# Patient Record
(demographics unavailable — no encounter records)

---

## 2024-12-27 NOTE — HISTORY OF PRESENT ILLNESS
[FreeTextEntry1] : Carson Wick is a 56- year- old male with History of DVT and Pulmonary embolism admitted to [facility] in 8/2024 and was treated and discharged on Eliquis. Says he was told to have cardiomyopathy and no cardiac medicines started. History of HIV. Current cigarette smoker. On and off Cocaine use. On Methadone. Lives alone. Denies any chest pain or palpitations. Shortness of breath on exertion which is relieved with rest in few minutes of few months' duration. Says compliant to medications. Does not eat much. Has BP on lower side. Occasional dizziness.

## 2024-12-27 NOTE — DISCUSSION/SUMMARY
[FreeTextEntry1] : In a summary Carson Wick is a middle- aged- male with cardiomyopathy and shortness of breath, will get Echo to assess LV systolic function. Further plan based on Echo results. BP on lower side, eat well and drink fluids. Use little more salt in food. Counseled to quit smoking cigarettes and cocaine use. Very well aware of adverse outcomes of continued use. Follow up in 2 weeks. Hospital discharge reviewed. Spent 50 minutes on encounter.

## 2024-12-27 NOTE — REVIEW OF SYSTEMS
[Dyspnea on exertion] : dyspnea during exertion [Joint Pain] : joint pain [Dizziness] : dizziness [Depression] : depression [Anxiety] : anxiety [Negative] : Respiratory [Blurry Vision] : no blurred vision [Chest Discomfort] : no chest discomfort [Lower Ext Edema] : no extremity edema [Palpitations] : no palpitations [Orthopnea] : no orthopnea [PND] : no PND [Abdominal Pain] : no abdominal pain [Nausea] : no nausea [Vomiting] : no vomiting [Heartburn] : no heartburn [Rash] : no rash [Itching] : no itching [Confusion] : no confusion was observed [Easy Bleeding] : no tendency for easy bleeding [Easy Bruising] : no tendency for easy bruising

## 2024-12-27 NOTE — PHYSICAL EXAM
[Normal Conjunctiva] : normal conjunctiva [No Carotid Bruit] : no carotid bruit [Soft] : abdomen soft [Non Tender] : non-tender [Normal Bowel Sounds] : normal bowel sounds [No Edema] : no edema [No Cyanosis] : no cyanosis [No Rash] : no rash [Normal] : alert and oriented, normal memory [Normal memory] : normal memory

## 2025-01-13 NOTE — HISTORY OF PRESENT ILLNESS
[FreeTextEntry1] : Carson Wick is a 56- year- old male with History of DVT and Pulmonary embolism admitted to [facility] in 8/2024 and was treated and discharged on Eliquis. Says he was told to have cardiomyopathy and no cardiac medicines started. History of HIV. Current cigarette smoker. On and off Cocaine use. On Methadone. Lives alone. Denies any chest pain or palpitations. Shortness of breath on exertion which is relieved with rest in few minutes of few months' duration. Says compliant to medications. Started eating better. Drinking enough fluids. Dizziness improved.

## 2025-01-13 NOTE — DISCUSSION/SUMMARY
[FreeTextEntry1] : In a summary Jorge Luis Wicko is a middle- aged- male with cardiomyopathy and shortness of breath, will get Echo to assess LV systolic function next visit. Further plan based on Echo results. BP on lower side hence no medications started.  Counseled to quit smoking cigarettes and cocaine use. Very well aware of adverse outcomes of continued use. Follow up in 3 months.

## 2025-05-21 NOTE — REASON FOR VISIT
[Initial] : an initial visit [Pneumonia] : pneumonia [COPD] : COPD [Pulmonary Embolism] : pulmonary embolism

## 2025-05-22 NOTE — REVIEW OF SYSTEMS
[Negative] : Endocrine [Cough] : cough [Sputum] : sputum [SOB on Exertion] : sob on exertion [Seasonal Allergies] : seasonal allergies [Chronic Pain] : chronic pain

## 2025-05-22 NOTE — PROCEDURE
[FreeTextEntry1] : 03/31/2025 [facility] CT ANGIO CHEST PULM ART WAWIC FINDINGS: LINES AND TUBES: None PULMONARY ARTERIES: There is suboptimal timing of the contrast bolus. There is opacification through the level of the lobar and segmental pulmonary arteries. There is no respiratory motion artifact. No filling defects are present to the level of the lobar and segmental pulmonary arteries. Evaluation of more distal subsegmental pulmonary arteries is limited by the suboptimal contrast bolus.The main pulmonary artery is normal in size. The heart size is within normal limits. The right heart is not enlarged. MEDIASTINUM: The thyroid and thoracic inlet are normal. . There are prominent mediastinal and hilar lymph nodes. For instance, 1.2 cm subcarinal lymph nodes, 1.2 cm left hilar lymph nodes, and the 10 mm right hilar lymph note are noted. No pericardial effusion is present. The esophagus is normal. LUNGS/PLEURA: Central tracheobronchial tree is patent. There is moderate emphysema in bilateral lungs. There are bronchiectasis/bronchiolectasis and subpleural reticulations/fibrotic changes in bilateral lungs. There is redemonstration of bronchial secretion and residual peribronchovascular groundglass opacities and consolidations most prominent in the upper lobes. Areas of consolidation at lung bases have decreased. There is stable pulmonary nodules (for instance 1.3 cm subpleural nodule in right lower lobe on series 5, image 574 and 7 mm nodule in left lower lobe on series 5, image 480). SUPERIOR ABDOMEN: Visualized superior abdomen is unremarkable OSSEOUS STRUCTURES AND SOFT TISSUES: There are degenerative changes of thoracic spine. There are calcified formation on anterolateral aspect of right third-fifth ribs, suggestive of healing fractures. Soft tissues grossly normal. IMPRESSION: No pulmonary embolism through the level of the lobar and segmental pulmonary arteries. Evaluation of more distal subsegmental pulmonary arteries is limited by suboptimal contrast bolus timing Redemonstration of residual peribronchovascular groundglass opacities and consolidations most prominent in the upper lobes   08/15/2024   [facility] CT CHEST FINDINGS: AIRWAYS, LUNGS, PLEURA: Multifocal and bilateral consolidation primarily  involving the anterior right upper lobe, right middle lobe, and lung  bases. Suspectedperipheral architectural lung distortion towards the right  apical region (image 44, series 3) and right base (image 96, series 3). Trace left pleural effusion. Right posterior pleural thickening. MEDIASTINUM: Heart size normal. Coronary calcifications. No pericardial  effusion. Thoracic aorta normal caliber.  Multiple mediastinal nodes, a  few of which are mildly enlarged and are likely reactive. IMAGED ABDOMEN: Cholelithiasis. SOFT TISSUES: Unremarkable. BONES: Nonacute appearing left anterior fourth rib fracture. IMPRESSION:. Findings compatible with multifocal pneumonia. Recommend CT chest  follow-up in 3 months to ensure clearing. Mediastinal lymphadenopathy, likely reactive. Coronary calcifications.  08/10/2024  [facility] CT ANGIO CHEST PULM ART WAWI FINDINGS: CHEST: PULMONARY ARTERIES: Acute pulmonary embolism in the medial segmental  branch of the right middle lobe. There is evidence of right heart strain. LUNGS AND LARGE AIRWAYS: The central airways are patent. Bilateral mild  bronchiectasis. Diffuse bilateral patchy airspace disease, worse on the  right. PLEURA: No pleural abnormality. VESSELS: Normal caliber aorta. HEART: Mild cardiomegaly. No pericardial effusion. Coronary artery  calcifications are present. MEDIASTINUM AND AMANDA: Mild mediastinal and subcarinal adenopathy. Right  hilar adenopathy as well. CHEST WALL AND LOWER NECK: No masses.  ABDOMEN AND PELVIS: LIVER: Normal. BILE DUCTS: Nondilated. GALLBLADDER: Normal. SPLEEN: Normal. PANCREAS: Diffuse atrophy. ADRENALS: Normal. KIDNEYS/URETERS: No calculi, hydronephrosis, or soft tissue attenuating  mass. BLADDER: Obscured by artifact. REPRODUCTIVE ORGANS:Obscured by artifact. BOWEL: No bowel-related abnormality. No bowel obstruction or bowel  inflammation. Normal appendix and ileocecal region. No evidence of active  colitis or diverticulitis. PERITONEUM: No free air or ascites. VESSELS: Aortoiliac atherosclerosis without aneurysm. RETROPERITONEUM/LYMPH NODES: No adenopathy. ABDOMINAL WALL: Normal. BONES: No acute bony abnormality. IMPRESSION: *  Acute pulmonary embolism in the medial segmental branch of the right  middle lobe. There is evidence of right heart strain. *  Diffuse bilateral patchy airspace disease, worse on the right.  Correlation pneumonia. *  Mediastinal and hilar adenopathy, likely reactive. *  No acute abdominal pathology.

## 2025-05-22 NOTE — ASSESSMENT
[FreeTextEntry1] : Mr. LEGGETT is a 57 year man, active smoker (1ppd but cutting down x 40y), with PMH COPD/emphysema, HIV, DVT/PE on apixaban, cardiomyopathy, polysubstance use (cocaine, on methadone), depression, who presents to Pulmonary clinic for evaluation after hospitalization last year. Has had repeat CT chest 3/2025 which demonstrated resolution of PE, prominent mediastinal/hilar LNs, moderate emphysema, bronchiectasis/bronchiolectasis most prominently RUL, with interstitial fibrosis, scattered decreased GGA, decreased consolidation at lung bases, stable pulmonary nodules. Suspect fibrotic changes and bronchiectasis are a/w longstanding crack cocaine use, possibly related to h/o LRTIs in setting of underlying HIV as well. Suspect GOLD E COPD in setting of ILD 2/2 inhalational drug use.   Plan: - Trial of Stiolto 2 puffs daily - C/w albuterol q4-6h PRN SOB/wheezing; can also use 15-30 mins prior to planned activity - Obtain full PFTs prior to next appt, including lung volume, spirometry, bronchodilator responsiveness, DLCO - Complete course of abx as per PCP, will monitor for chest congestion/clinical bronchiectasis and discuss airway hygiene regimen if significant burden s/p initiation of LAMA/LABA at next visit - Repeat CT chest (qualifies for LDCT) lung ca screening and eval for interval change of pulmonary nodules in 6-12mos - Counseled at length regarding smoking cessation, pt is amenable and is cutting down with use of adjunct NRT TD - Return to clinic in 1-2 months to re-evaluate respiratory sx and review results of above. Pt knows to call for any interval pulmonary issues or concerns

## 2025-05-22 NOTE — PROCEDURE
[FreeTextEntry1] : 03/31/2025 [facility] CT ANGIO CHEST PULM ART WAWIC FINDINGS: LINES AND TUBES: None PULMONARY ARTERIES: There is suboptimal timing of the contrast bolus. There is opacification through the level of the lobar and segmental pulmonary arteries. There is no respiratory motion artifact. No filling defects are present to the level of the lobar and segmental pulmonary arteries. Evaluation of more distal subsegmental pulmonary arteries is limited by the suboptimal contrast bolus.The main pulmonary artery is normal in size. The heart size is within normal limits. The right heart is not enlarged. MEDIASTINUM: The thyroid and thoracic inlet are normal. . There are prominent mediastinal and hilar lymph nodes. For instance, 1.2 cm subcarinal lymph nodes, 1.2 cm left hilar lymph nodes, and the 10 mm right hilar lymph note are noted. No pericardial effusion is present. The esophagus is normal. LUNGS/PLEURA: Central tracheobronchial tree is patent. There is moderate emphysema in bilateral lungs. There are bronchiectasis/bronchiolectasis and subpleural reticulations/fibrotic changes in bilateral lungs. There is redemonstration of bronchial secretion and residual peribronchovascular groundglass opacities and consolidations most prominent in the upper lobes. Areas of consolidation at lung bases have decreased. There is stable pulmonary nodules (for instance 1.3 cm subpleural nodule in right lower lobe on series 5, image 574 and 7 mm nodule in left lower lobe on series 5, image 480). SUPERIOR ABDOMEN: Visualized superior abdomen is unremarkable OSSEOUS STRUCTURES AND SOFT TISSUES: There are degenerative changes of thoracic spine. There are calcified formation on anterolateral aspect of right third-fifth ribs, suggestive of healing fractures. Soft tissues grossly normal. IMPRESSION: No pulmonary embolism through the level of the lobar and segmental pulmonary arteries. Evaluation of more distal subsegmental pulmonary arteries is limited by suboptimal contrast bolus timing Redemonstration of residual peribronchovascular groundglass opacities and consolidations most prominent in the upper lobes   08/15/2024   [facility] CT CHEST FINDINGS: AIRWAYS, LUNGS, PLEURA: Multifocal and bilateral consolidation primarily  involving the anterior right upper lobe, right middle lobe, and lung  bases. Suspectedperipheral architectural lung distortion towards the right  apical region (image 44, series 3) and right base (image 96, series 3). Trace left pleural effusion. Right posterior pleural thickening. MEDIASTINUM: Heart size normal. Coronary calcifications. No pericardial  effusion. Thoracic aorta normal caliber.  Multiple mediastinal nodes, a  few of which are mildly enlarged and are likely reactive. IMAGED ABDOMEN: Cholelithiasis. SOFT TISSUES: Unremarkable. BONES: Nonacute appearing left anterior fourth rib fracture. IMPRESSION:. Findings compatible with multifocal pneumonia. Recommend CT chest  follow-up in 3 months to ensure clearing. Mediastinal lymphadenopathy, likely reactive. Coronary calcifications.  08/10/2024  [facility] CT ANGIO CHEST PULM ART WAWI FINDINGS: CHEST: PULMONARY ARTERIES: Acute pulmonary embolism in the medial segmental  branch of the right middle lobe. There is evidence of right heart strain. LUNGS AND LARGE AIRWAYS: The central airways are patent. Bilateral mild  bronchiectasis. Diffuse bilateral patchy airspace disease, worse on the  right. PLEURA: No pleural abnormality. VESSELS: Normal caliber aorta. HEART: Mild cardiomegaly. No pericardial effusion. Coronary artery  calcifications are present. MEDIASTINUM AND AMANDA: Mild mediastinal and subcarinal adenopathy. Right  hilar adenopathy as well. CHEST WALL AND LOWER NECK: No masses.  ABDOMEN AND PELVIS: LIVER: Normal. BILE DUCTS: Nondilated. GALLBLADDER: Normal. SPLEEN: Normal. PANCREAS: Diffuse atrophy. ADRENALS: Normal. KIDNEYS/URETERS: No calculi, hydronephrosis, or soft tissue attenuating  mass. BLADDER: Obscured by artifact. REPRODUCTIVE ORGANS:Obscured by artifact. BOWEL: No bowel-related abnormality. No bowel obstruction or bowel  inflammation. Normal appendix and ileocecal region. No evidence of active  colitis or diverticulitis. PERITONEUM: No free air or ascites. VESSELS: Aortoiliac atherosclerosis without aneurysm. RETROPERITONEUM/LYMPH NODES: No adenopathy. ABDOMINAL WALL: Normal. BONES: No acute bony abnormality. IMPRESSION: *  Acute pulmonary embolism in the medial segmental branch of the right  middle lobe. There is evidence of right heart strain. *  Diffuse bilateral patchy airspace disease, worse on the right.  Correlation pneumonia. *  Mediastinal and hilar adenopathy, likely reactive. *  No acute abdominal pathology.

## 2025-05-22 NOTE — HISTORY OF PRESENT ILLNESS
[Current] : current [>= 20 pack years] : >= 20 pack years [TextBox_4] : Mr. LEGGETT is a 57 year man, active smoker (1ppd but cutting down x 40y), with PMH HIV, DVT/PE on apixaban, cardiomyopathy, polysubstance use (cocaine, on methadone), depression, who presents to Pulmonary clinic for evaluation after hospitalization last year. 05/21/2025 Pt was hospitalized [facility] 8/2024 for respiratory sx, initially f/w acute PE of medial segmental branch of RML, also f/w multifocal pna. Now coming for f/u for chronic respiratory sx. Follows with a hematologist, stopped DOAC 4/2025. Last CD4ct 500-600 Continues to smoke, up to 10-15 cigarettes daily, notices that it "slows down" his activity 2/2 SOB. No h/o asthma. SOB triggered by exertion, fumes from cleaning supplies. Pt's friend from [facility] gives him fluticasone nasal sprays for seasonal allergies. Pt has chronic cough and phlegm. Sputum came up bright yellow recently, PCP gave him 5 days of augmentin and is on day 3, has felt slightly improved. There are nights and AM that he coughs, sometimes dry and sometimes with sputum. Pt continues to use cocaine occasionally, also smokes crack occasionally (last time was last week, then 2-3mos prior), used it regularly when he was younger in the 80s. Has symbicort, uses as needed but rarely uses it as he feels less efficacious. Uses albuterol about 1-2 times daily.  At this time, he denies anginal/pleuritic CP, SOB at rest or with mild exertion, wheezing, stridor, orthopnea, hemoptysis, LE edema, sick contacts, recent travel, recent hospitalization, active f/c/n/v.  [TextBox_11] : 1 [TextBox_13] : 40